# Patient Record
Sex: MALE | Race: WHITE | HISPANIC OR LATINO | Employment: FULL TIME | URBAN - METROPOLITAN AREA
[De-identification: names, ages, dates, MRNs, and addresses within clinical notes are randomized per-mention and may not be internally consistent; named-entity substitution may affect disease eponyms.]

---

## 2021-02-20 ENCOUNTER — HOSPITAL ENCOUNTER (EMERGENCY)
Facility: CLINIC | Age: 36
Discharge: HOME | End: 2021-02-20
Attending: EMERGENCY MEDICINE | Admitting: EMERGENCY MEDICINE
Payer: COMMERCIAL

## 2021-02-20 ENCOUNTER — APPOINTMENT (OUTPATIENT)
Dept: GENERAL RADIOLOGY | Facility: CLINIC | Age: 36
End: 2021-02-20
Attending: EMERGENCY MEDICINE
Payer: COMMERCIAL

## 2021-02-20 VITALS
HEIGHT: 66 IN | SYSTOLIC BLOOD PRESSURE: 153 MMHG | DIASTOLIC BLOOD PRESSURE: 93 MMHG | RESPIRATION RATE: 18 BRPM | WEIGHT: 187 LBS | BODY MASS INDEX: 30.05 KG/M2 | HEART RATE: 74 BPM | TEMPERATURE: 97.4 F | OXYGEN SATURATION: 99 %

## 2021-02-20 DIAGNOSIS — M54.2 NECK PAIN: ICD-10-CM

## 2021-02-20 PROCEDURE — 250N000011 HC RX IP 250 OP 636: Performed by: EMERGENCY MEDICINE

## 2021-02-20 PROCEDURE — 72040 X-RAY EXAM NECK SPINE 2-3 VW: CPT

## 2021-02-20 PROCEDURE — 99283 EMERGENCY DEPT VISIT LOW MDM: CPT

## 2021-02-20 PROCEDURE — 250N000013 HC RX MED GY IP 250 OP 250 PS 637: Performed by: EMERGENCY MEDICINE

## 2021-02-20 RX ORDER — IBUPROFEN 600 MG/1
600 TABLET, FILM COATED ORAL ONCE
Status: COMPLETED | OUTPATIENT
Start: 2021-02-20 | End: 2021-02-20

## 2021-02-20 RX ORDER — HYDROCODONE BITARTRATE AND ACETAMINOPHEN 5; 325 MG/1; MG/1
2 TABLET ORAL ONCE
Status: COMPLETED | OUTPATIENT
Start: 2021-02-20 | End: 2021-02-20

## 2021-02-20 RX ORDER — ONDANSETRON 4 MG/1
4 TABLET, ORALLY DISINTEGRATING ORAL EVERY 8 HOURS PRN
Qty: 10 TABLET | Refills: 0 | Status: SHIPPED | OUTPATIENT
Start: 2021-02-20 | End: 2021-02-23

## 2021-02-20 RX ORDER — HYDROCODONE BITARTRATE AND ACETAMINOPHEN 5; 325 MG/1; MG/1
1 TABLET ORAL EVERY 6 HOURS PRN
Qty: 10 TABLET | Refills: 0 | Status: SHIPPED | OUTPATIENT
Start: 2021-02-20 | End: 2021-02-23

## 2021-02-20 RX ORDER — CYCLOBENZAPRINE HCL 10 MG
10 TABLET ORAL ONCE
Status: COMPLETED | OUTPATIENT
Start: 2021-02-20 | End: 2021-02-20

## 2021-02-20 RX ORDER — ONDANSETRON 4 MG/1
4 TABLET, ORALLY DISINTEGRATING ORAL ONCE
Status: COMPLETED | OUTPATIENT
Start: 2021-02-20 | End: 2021-02-20

## 2021-02-20 RX ORDER — ACETAMINOPHEN 325 MG/1
975 TABLET ORAL ONCE
Status: COMPLETED | OUTPATIENT
Start: 2021-02-20 | End: 2021-02-20

## 2021-02-20 RX ADMIN — CYCLOBENZAPRINE 10 MG: 10 TABLET, FILM COATED ORAL at 05:36

## 2021-02-20 RX ADMIN — HYDROCODONE BITARTRATE AND ACETAMINOPHEN 2 TABLET: 5; 325 TABLET ORAL at 06:33

## 2021-02-20 RX ADMIN — ACETAMINOPHEN 975 MG: 325 TABLET, FILM COATED ORAL at 05:36

## 2021-02-20 RX ADMIN — ONDANSETRON 4 MG: 4 TABLET, ORALLY DISINTEGRATING ORAL at 06:33

## 2021-02-20 RX ADMIN — IBUPROFEN 600 MG: 600 TABLET, FILM COATED ORAL at 05:36

## 2021-02-20 ASSESSMENT — MIFFLIN-ST. JEOR: SCORE: 1730.98

## 2021-02-20 ASSESSMENT — ENCOUNTER SYMPTOMS
NECK PAIN: 1
MYALGIAS: 1
BACK PAIN: 1

## 2021-02-20 NOTE — ED PROVIDER NOTES
"  History   Chief Complaint:  Neck Pain       The history is provided by the patient.      Benjamin Torres is a 35 year old male with history of tobacco abuse who presents for evaluation of neck pain. The patient developed posterior neck pain 24 hours ago after he leaned over to brush his teeth, stating he had to use his hands to pick his head back up. He went to the chiropractor two hours later and pain was improved for about 45 minutes, but he presents to the ED because pain is overwhelming. Pain radiates down both arms to the elbows and down the back just below the shoulder blades. He took 600 mg ibuprofen without improvement.     Review of Systems   Musculoskeletal: Positive for back pain, myalgias and neck pain.   All other systems reviewed and are negative.      Allergies:  No Known Allergies    Medications:  The patient is currently on no regular medications.    Past Medical History:     Sebaceous cyst vs lipoma   Tobacco abuse     Past Surgical History:    Fracture tx, ankle rt/lt    Family History:    Hypertension     Social History:  Presents with his wife  Tobacco use: history of tobacco abuse     Physical Exam     Patient Vitals for the past 24 hrs:   BP Temp Temp src Pulse Resp SpO2 Height Weight   02/20/21 0753 -- -- -- -- 18 -- -- --   02/20/21 0513 (!) 153/93 97.4  F (36.3  C) Oral 74 16 99 % 1.676 m (5' 6\") 84.8 kg (187 lb)       Physical Exam  Vitals: reviewed by me  General: Pt seen on [facility], pleasant, cooperative, and alert to conversation  Eyes: Tracking well, clear conjunctiva BL  ENT: MMM, midline trachea.  Very guarded, but able to look in both directions 45 degrees.  Full range of motion anteriorly with chin to chest maneuver.  Lungs:   No tachypnea, no accessory muscle use. No respiratory distress.   CV: Rate as above, regular rhythm.    MSK: no peripheral edema or joint effusion.  No evidence of trauma  Skin: No rash, normal turgor and temperature  Neuro: Clear speech and no " facial droop.  Bilateral upper extremities with sensation intact light touch to first dorsal webspace, index finger and pinky finger.  Can give me the A-OK, thumbs up, 2 3 cross.  5 out of 5  strength elbow flexion elbow extension shoulder flexion shoulder extension.  Psych: Not RIS, no e/o AH/VH      Emergency Department Course     Imaging:  XR Cervical Spine 2/3 Views:  Mild reversal normal lordotic curvature. Mild prevertebral soft tissue thickening at C4 measuring 7 mm, nonspecific, as per radiology.      Procedures  None    Emergency Department Course:    Reviewed:  I reviewed nursing notes, vitals and past medical history    Assessments:  0613 I obtained history and examined the patient as noted above.   0742 I rechecked the patient and explained findings. He is feeling improved after medication.     Interventions:  0536 Tylenol, 975 mg, PO  0536 Ibuprofen, 600 mg, PO  0536 Flexeril, 10 mg, PO  0633 Hydrocodone-acetaminophen 5-325 mg per tablet, 2 tablet, PO  0633 Zofran, 4 mg, PO    Disposition:  The patient was discharged to home.       Impression & Plan     CMS Diagnoses: None    Medical Decision Making:  Benjamin Torres is a very pleasant 35 year old male who presents to the emergency room with 24 hours of what appears to be possible disc disease in his neck. He has no radicular symptoms, he has no neurologic issues, and his pain and range of motion to his neck has improved with pain control here. I do no think he needs an MRI, I do not think that he has an urgent neurologic impairment based on my exam and history and his improvement here in the ER. We talked about how he should follow with his regular doctor in the next week, and also was given some pain medications to go home with. Very clear return to ED precautions were given for any type of neurologic issue that could possibly develop if his disc disease gets worse. Will plan for discharge home, thankfully the x-ray of his neck is also normal,  and there is no history of trauma.       Covid-19  Benjamin Torres was evaluated during a global COVID-19 pandemic, which necessitated consideration that the patient might be at risk for infection with the SARS-CoV-2 virus that causes COVID-19.   Applicable protocols for evaluation were followed during the patient's care.   COVID-19 was considered as part of the patient's evaluation. The plan for testing is:  the patient was referred for outpatient testing.    Diagnosis:    ICD-10-CM    1. Neck pain  M54.2        Discharge Medications:  Discharge Medication List as of 2/20/2021  7:44 AM      START taking these medications    Details   HYDROcodone-acetaminophen (NORCO) 5-325 MG tablet Take 1 tablet by mouth every 6 hours as needed, Disp-10 tablet, R-0, Local Print      ondansetron (ZOFRAN ODT) 4 MG ODT tab Take 1 tablet (4 mg) by mouth every 8 hours as needed, Disp-10 tablet, R-0, Local Print             Scribe Disclosure:  I, Jordana Fuentes, am serving as a scribe at 6:02 AM on 2/20/2021 to document services personally performed by Maxim Solano MD based on my observations and the provider's statements to me.        Maxim Solano MD  02/20/21 [id number]

## 2021-02-20 NOTE — ED NOTES
Pt states that yesterday morning he bent to brush his teeth and when he lifted his head to look up he experienced pain on the back of his neck radiating left arm.    Now c/o posterior neck pain radiating down right arm.     Pt reports going to a chiropractor and having x-rays done.     Denies having anything prescribed.     Took 600 MG Ibuprofen yesterday.     Now pain getting worse prompting this visit.

## 2021-02-20 NOTE — LETTER
February 20, 2021      To Whom It May Concern:      Benjamin Torres was seen in our Emergency Department today, 02/20/21.  I expect his condition to improve over the next 1-2days.  He may return to work when improved.    Sincerely,        LUIS Jeffries

## 2021-02-20 NOTE — DISCHARGE INSTRUCTIONS
As we discussed, you need to come back to emergency room immediately if you have any nerve problems, and this would look like numbness, tingling in your hands, or with any loss of motor function.  Come back to the emergency room with any other concerns, or if the medication we have given you does not control your neck pain.  Otherwise I do believe you are safe to follow with your regular doctor, I have given you information to follow with a clinic in [place] in the next week.  This is very important, do be sure to follow-up within 1 week.    Please also use Tylenol and Motrin for your pain.

## 2021-03-02 ENCOUNTER — OFFICE VISIT (OUTPATIENT)
Dept: INTERNAL MEDICINE | Facility: CLINIC | Age: 36
End: 2021-03-02
Payer: COMMERCIAL

## 2021-03-02 VITALS
SYSTOLIC BLOOD PRESSURE: 120 MMHG | OXYGEN SATURATION: 99 % | BODY MASS INDEX: 31.49 KG/M2 | WEIGHT: 195.1 LBS | DIASTOLIC BLOOD PRESSURE: 85 MMHG | RESPIRATION RATE: 16 BRPM | TEMPERATURE: 97.3 F | HEART RATE: 77 BPM

## 2021-03-02 DIAGNOSIS — M54.2 CERVICALGIA: Primary | ICD-10-CM

## 2021-03-02 PROCEDURE — 99204 OFFICE O/P NEW MOD 45 MIN: CPT | Performed by: INTERNAL MEDICINE

## 2021-03-02 RX ORDER — PREDNISONE 20 MG/1
TABLET ORAL
Qty: 20 TABLET | Refills: 0 | Status: SHIPPED | OUTPATIENT
Start: 2021-03-02 | End: 2021-03-14

## 2021-03-02 NOTE — PROGRESS NOTES
"    Assessment & Plan     Cervicalgia    *  Based on your history and exam,  No evidence for herniated disc, spinal stenosis, or vertebral fracture or any other concerning cause.    *  Due to the degree of inflammation in the neck soft tissues;     --Take tapering course of steroids:     --Prednisone 60 mg per day for 3 days, then 40 mg per day for 3 days, then 20 mg per day for 2 days, then 10 mg  (1/2 of 20 mg tablet) per day for 2 days, then stop    *  Once you are done with the steroids, OK to use the over the counter anti-inflammatory     --Aleve 2 tablets twice per day (with food) every day for the next one week, then twice per day as needed.     OR   --Motrin/Advil/Ibuprofen 400-600 mg three times pre day for the next one week, then 2-3 times per day as needed    *  Take Tizanidine (generic Zanaflex) 2 or 4 mg three times per day as needed for severe muscle spasms.  Do not take if you do not have muscle spasms.  The main side effect from this medication is drowsiness.  Do not drink alcohol after taking this, do not drive after taking this, do not use heavy machinery or perform dangerous tasks after taking due to the possible drowsiness.       *  Physical therapy through [facility] (many locations throughout the [facility] and [facility]).   They can help you with the neck issue.    *  At a minimum, performing simple stretches of the upper back, shoulder, neck areas.    Google \"upper back stretching exercises\" and review that information, including videos on how to stretch these tissues.     *  Moist heat in the form of a shower, hot towel, or microwavable bean bag few times per day can help relieve some of the spasm.  One example is a \"Bed Buddy\" available at most drug stores such as [employer].  See the web page :  Http://www.[employer]/products/detail/292 for more details.    *  Consider over the counter Lidocaine patches on the affected area.  Place 1-2 Lidocaine patches direclty " "over the painful area for the most bothersome 12-14 hours of the day, then remove.  You should not wear these patches for more than 12-14 hours per day.   Insurances will almost always not cover lidocaine patches, so you need to get them from the pharmacist.     *  Expect your upper back and neck areas to be more easily re-aggravated over the next couple of weeks to months.  the soft tissue and muscles are going to be more easily re-irritated over the next several weeks so be careful to return to physical action slowly.    *  If your symptoms worsen, do not improve, or If you develop worsening or concerning numbness, tingling or weakness in your hands, fingers, or arms, contact us.              - predniSONE (DELTASONE) 20 MG tablet; Take 3 tablets (60 mg) by mouth daily for 3 days, THEN 2 tablets (40 mg) daily for 3 days, THEN 1 tablet (20 mg) daily for 3 days, THEN 0.5 tablets (10 mg) daily for 3 days.  - tiZANidine (ZANAFLEX) 4 MG tablet; Take 0.5-1 tablets (2-4 mg) by mouth 3 times daily as needed for muscle spasms  - [facility] HAND, AND CHIROPRACTIC REFERRAL; Future             Tobacco Cessation:   reports that he has been smoking cigarettes. He has been smoking about 0.30 packs per day. He has never used smokeless tobacco.      BMI:   Estimated body mass index is 31.49 kg/m  as calculated from the following:    Height as of 2/20/21: 1.676 m (5' 6\").    Weight as of this encounter: 88.5 kg (195 lb 1.6 oz).           Return in about 3 weeks (around 3/23/2021) for for recheck, if the symptoms fail to improve.    Lewis Baumann MD  [facility] CASI Alexander is a 35 year old who presents for the following health issues     HPI       ED/UC Followup:    Facility:  [facility]  Date of visit: 02/20/2021  Reason for visit: Neck Pain- was given hydocodone-acetaminophen, ondansetron  Current Status: sitting, standing, or laying bed still in pain.  Awake because of the pain. " The hydrocodone worked for the first day and they are not working. Is not taking the ondansetron not feeling nauseous. Shooting pains that come and go. Pt is seeing a chiropractor helps a bit but, then the pain will come back. Hard to move.        Pt is going to apply for a TAWANDA.   Pt needs a note excusing him from work until he can get the TAWANDA.     2 jobs:  [employer] store and [employer]     **I reviewed the information recorded in the patient's EPIC chart (including but not limited to medical history, surgical history, family history, problem list, medication list, and allergy list) and updated the information as indicated based on the patients reported information.         Review of Systems   Constitutional, HEENT, cardiovascular, pulmonary, gi and gu systems are negative, except as otherwise noted.      Objective    /85 (BP Location: Right arm, Patient Position: Chair, Cuff Size: Adult Large)   Pulse 77   Temp 97.3  F (36.3  C) (Temporal)   Resp 16   Wt 88.5 kg (195 lb 1.6 oz)   SpO2 99%   BMI 31.49 kg/m    Body mass index is 31.49 kg/m .  Physical Exam     GENERAL alert and no distress  EYES:  Normal sclera,conjunctiva, EOMI  HENT: oral and posterior pharynx without lesions or erythema, facies symmetric  NECK: Neck supple. No LAD, without thyroidmegaly.  Neck shows tight cervical para cervical muscles in spasm. Palpation of these muscles does reproduce the pain exactly.   Normal , finger, bicep, and tricep strength in both arms.  Negative spurlings maneuver.   Normal bicep, tricep, brachioradialis reflexs on both sides.  Neck has FROM in all directions.  No pain with direct palpation of the cervical bodies themselves, the pain is in the surrounding soft/connective tissues.  RESP: Clear to ausculation bilaterally without wheezes or crackles. Normal BS in all fields.  CV: RRR normal S1S2 without murmurs, rubs or gallops.  LYMPH: no cervical lymph adenopathy appreciated  MS: extremities- no gross  deformities of the visible extremities noted,   EXT:  no lower extremity edema  PSYCH: Alert and oriented times 3; speech- coherent  SKIN:  No obvious significant skin lesions on visible portions of face

## 2021-03-02 NOTE — PATIENT INSTRUCTIONS
"  CERVICAL MUSCLE/NECK PAIN:     *  Based on your history and exam,  No evidence for herniated disc, spinal stenosis, or vertebral fracture or any other concerning cause.    *  Due to the degree of inflammation in the neck soft tissues;     --Take tapering course of steroids:     --Prednisone 60 mg per day for 3 days, then 40 mg per day for 3 days, then 20 mg per day for 2 days, then 10 mg  (1/2 of 20 mg tablet) per day for 2 days, then stop    *  Once you are done with the steroids, OK to use the over the counter anti-inflammatory     --Aleve 2 tablets twice per day (with food) every day for the next one week, then twice per day as needed.     OR   --Motrin/Advil/Ibuprofen 400-600 mg three times pre day for the next one week, then 2-3 times per day as needed    *  Take Tizanidine (generic Zanaflex) 2 or 4 mg three times per day as needed for severe muscle spasms.  Do not take if you do not have muscle spasms.  The main side effect from this medication is drowsiness.  Do not drink alcohol after taking this, do not drive after taking this, do not use heavy machinery or perform dangerous tasks after taking due to the possible drowsiness.       *  Physical therapy through [facility] (many locations throughout the [facility] and [facility]).   They can help you with the neck issue.    *  At a minimum, performing simple stretches of the upper back, shoulder, neck areas.    Google \"upper back stretching exercises\" and review that information, including videos on how to stretch these tissues.     *  Moist heat in the form of a shower, hot towel, or microwavable bean bag few times per day can help relieve some of the spasm.  One example is a \"Bed Buddy\" available at most drug stores such as [employer].  See the web page :  Http://www.[employer]/products/detail/292 for more details.    *  Consider over the counter Lidocaine patches on the affected area.  Place 1-2 Lidocaine patches direclty over the " painful area for the most bothersome 12-14 hours of the day, then remove.  You should not wear these patches for more than 12-14 hours per day.   Insurances will almost always not cover lidocaine patches, so you need to get them from the pharmacist.     Examples of over the counter Lidocaine patches:          *  Expect your upper back and neck areas to be more easily re-aggravated over the next couple of weeks to months.  the soft tissue and muscles are going to be more easily re-irritated over the next several weeks so be careful to return to physical action slowly.    *  If your symptoms worsen, do not improve, or If you develop worsening or concerning numbness, tingling or weakness in your hands, fingers, or arms, contact us.

## 2021-04-17 ENCOUNTER — HEALTH MAINTENANCE LETTER (OUTPATIENT)
Age: 36
End: 2021-04-17

## 2021-10-02 ENCOUNTER — HEALTH MAINTENANCE LETTER (OUTPATIENT)
Age: 36
End: 2021-10-02

## 2022-05-08 ENCOUNTER — HEALTH MAINTENANCE LETTER (OUTPATIENT)
Age: 37
End: 2022-05-08

## 2022-08-29 ENCOUNTER — APPOINTMENT (OUTPATIENT)
Dept: CT IMAGING | Facility: CLINIC | Age: 37
End: 2022-08-29
Attending: EMERGENCY MEDICINE
Payer: COMMERCIAL

## 2022-08-29 ENCOUNTER — HOSPITAL ENCOUNTER (EMERGENCY)
Facility: CLINIC | Age: 37
Discharge: HOME | End: 2022-08-29
Attending: EMERGENCY MEDICINE | Admitting: EMERGENCY MEDICINE
Payer: COMMERCIAL

## 2022-08-29 VITALS
OXYGEN SATURATION: 100 % | HEART RATE: 74 BPM | RESPIRATION RATE: 20 BRPM | TEMPERATURE: 98.7 F | DIASTOLIC BLOOD PRESSURE: 80 MMHG | SYSTOLIC BLOOD PRESSURE: 107 MMHG

## 2022-08-29 DIAGNOSIS — N20.1 URETEROLITHIASIS: ICD-10-CM

## 2022-08-29 DIAGNOSIS — N23 RENAL COLIC ON LEFT SIDE: ICD-10-CM

## 2022-08-29 DIAGNOSIS — R79.89 ELEVATED SERUM CREATININE: ICD-10-CM

## 2022-08-29 LAB
ALBUMIN UR-MCNC: NEGATIVE MG/DL
ANION GAP SERPL CALCULATED.3IONS-SCNC: 11 MMOL/L (ref 7–15)
APPEARANCE UR: CLEAR
BASOPHILS # BLD AUTO: 0 10E3/UL (ref 0–0.2)
BASOPHILS NFR BLD AUTO: 0 %
BILIRUB UR QL STRIP: NEGATIVE
BUN SERPL-MCNC: 13.6 MG/DL (ref 6–20)
CALCIUM SERPL-MCNC: 9.2 MG/DL (ref 8.6–10)
CHLORIDE SERPL-SCNC: 100 MMOL/L (ref 98–107)
COLOR UR AUTO: ABNORMAL
CREAT SERPL-MCNC: 1.21 MG/DL (ref 0.67–1.17)
DEPRECATED HCO3 PLAS-SCNC: 25 MMOL/L (ref 22–29)
EOSINOPHIL # BLD AUTO: 0.1 10E3/UL (ref 0–0.7)
EOSINOPHIL NFR BLD AUTO: 1 %
ERYTHROCYTE [DISTWIDTH] IN BLOOD BY AUTOMATED COUNT: 12.6 % (ref 10–15)
GFR SERPL CREATININE-BSD FRML MDRD: 80 ML/MIN/1.73M2
GLUCOSE SERPL-MCNC: 107 MG/DL (ref 70–99)
GLUCOSE UR STRIP-MCNC: NEGATIVE MG/DL
HCT VFR BLD AUTO: 42.8 % (ref 40–53)
HGB BLD-MCNC: 13.9 G/DL (ref 13.3–17.7)
HGB UR QL STRIP: ABNORMAL
HOLD SPECIMEN: NORMAL
IMM GRANULOCYTES # BLD: 0 10E3/UL
IMM GRANULOCYTES NFR BLD: 0 %
KETONES UR STRIP-MCNC: NEGATIVE MG/DL
LEUKOCYTE ESTERASE UR QL STRIP: NEGATIVE
LYMPHOCYTES # BLD AUTO: 2.1 10E3/UL (ref 0.8–5.3)
LYMPHOCYTES NFR BLD AUTO: 18 %
MCH RBC QN AUTO: 28.4 PG (ref 26.5–33)
MCHC RBC AUTO-ENTMCNC: 32.5 G/DL (ref 31.5–36.5)
MCV RBC AUTO: 87 FL (ref 78–100)
MONOCYTES # BLD AUTO: 0.5 10E3/UL (ref 0–1.3)
MONOCYTES NFR BLD AUTO: 5 %
NEUTROPHILS # BLD AUTO: 8.5 10E3/UL (ref 1.6–8.3)
NEUTROPHILS NFR BLD AUTO: 76 %
NITRATE UR QL: NEGATIVE
NRBC # BLD AUTO: 0 10E3/UL
NRBC BLD AUTO-RTO: 0 /100
PH UR STRIP: 5 [PH] (ref 5–7)
PLATELET # BLD AUTO: 265 10E3/UL (ref 150–450)
POTASSIUM SERPL-SCNC: 4.3 MMOL/L (ref 3.4–5.3)
RBC # BLD AUTO: 4.9 10E6/UL (ref 4.4–5.9)
RBC URINE: 39 /HPF
SODIUM SERPL-SCNC: 136 MMOL/L (ref 136–145)
SP GR UR STRIP: 1.02 (ref 1–1.03)
SQUAMOUS EPITHELIAL: <1 /HPF
UROBILINOGEN UR STRIP-MCNC: NORMAL MG/DL
WBC # BLD AUTO: 11.3 10E3/UL (ref 4–11)
WBC URINE: <1 /HPF

## 2022-08-29 PROCEDURE — 96374 THER/PROPH/DIAG INJ IV PUSH: CPT

## 2022-08-29 PROCEDURE — 85025 COMPLETE CBC W/AUTO DIFF WBC: CPT | Performed by: EMERGENCY MEDICINE

## 2022-08-29 PROCEDURE — 250N000013 HC RX MED GY IP 250 OP 250 PS 637: Performed by: EMERGENCY MEDICINE

## 2022-08-29 PROCEDURE — 36415 COLL VENOUS BLD VENIPUNCTURE: CPT | Performed by: EMERGENCY MEDICINE

## 2022-08-29 PROCEDURE — 99284 EMERGENCY DEPT VISIT MOD MDM: CPT | Mod: 25

## 2022-08-29 PROCEDURE — 74176 CT ABD & PELVIS W/O CONTRAST: CPT

## 2022-08-29 PROCEDURE — 250N000011 HC RX IP 250 OP 636: Performed by: EMERGENCY MEDICINE

## 2022-08-29 PROCEDURE — 80048 BASIC METABOLIC PNL TOTAL CA: CPT | Performed by: EMERGENCY MEDICINE

## 2022-08-29 PROCEDURE — 81001 URINALYSIS AUTO W/SCOPE: CPT | Performed by: EMERGENCY MEDICINE

## 2022-08-29 RX ORDER — OXYCODONE HYDROCHLORIDE 5 MG/1
5 TABLET ORAL EVERY 6 HOURS PRN
Qty: 12 TABLET | Refills: 0 | Status: SHIPPED | OUTPATIENT
Start: 2022-08-29

## 2022-08-29 RX ORDER — ONDANSETRON 4 MG/1
4 TABLET, ORALLY DISINTEGRATING ORAL EVERY 6 HOURS PRN
Qty: 10 TABLET | Refills: 0 | Status: SHIPPED | OUTPATIENT
Start: 2022-08-29 | End: 2022-09-01

## 2022-08-29 RX ORDER — TAMSULOSIN HYDROCHLORIDE 0.4 MG/1
0.4 CAPSULE ORAL DAILY
Qty: 7 CAPSULE | Refills: 0 | Status: SHIPPED | OUTPATIENT
Start: 2022-08-29 | End: 2022-09-05

## 2022-08-29 RX ORDER — TAMSULOSIN HYDROCHLORIDE 0.4 MG/1
0.4 CAPSULE ORAL ONCE
Status: COMPLETED | OUTPATIENT
Start: 2022-08-29 | End: 2022-08-29

## 2022-08-29 RX ORDER — KETOROLAC TROMETHAMINE 15 MG/ML
15 INJECTION, SOLUTION INTRAMUSCULAR; INTRAVENOUS ONCE
Status: COMPLETED | OUTPATIENT
Start: 2022-08-29 | End: 2022-08-29

## 2022-08-29 RX ADMIN — TAMSULOSIN HYDROCHLORIDE 0.4 MG: 0.4 CAPSULE ORAL at 03:32

## 2022-08-29 RX ADMIN — KETOROLAC TROMETHAMINE 15 MG: 15 INJECTION, SOLUTION INTRAMUSCULAR; INTRAVENOUS at 01:49

## 2022-08-29 ASSESSMENT — ACTIVITIES OF DAILY LIVING (ADL)
ADLS_ACUITY_SCORE: 35
ADLS_ACUITY_SCORE: 35

## 2022-08-29 NOTE — ED PROVIDER NOTES
Visit Date: 08/29/2022    CHIEF COMPLAINT:  Left flank pain.    HISTORY OF PRESENT ILLNESS:  This is a 37-year-old gentleman who has been dealing with left flank pain for the past week.  It has been on and off located on his left thigh.  He has had no falls or trauma.  He has noticed some dark urine and urgency to urinate with perhaps some burning at the tip of his penis.  No discharge.  No fevers.  He has been nauseous without vomiting.  No other complaints at this time.  His pain worsened this evening.    PAST MEDICAL HISTORY:  None.    PAST SURGICAL HISTORY:  Left ankle reconstruction surgery.    MEDICATIONS:  None.    ALLERGIES:  NONE.    SOCIAL HISTORY:  The patient presents by himself.  He smokes.    REVIEW OF SYSTEMS:  A pertinent 10-point review of systems is negative except for that noted in the HPI.    PHYSICAL EXAMINATION:    GENERAL:  The patient is uncomfortable appearing.  VITAL SIGNS:  Blood pressure 133/93, heart rate 99, respiratory rate 20, oxygen 97% on room air, temperature 98.7 degrees.  HEENT:  Moist mucous membranes.  CARDIOVASCULAR:  Regular rhythm, normal rate.  No murmurs.  RESPIRATORY:  Clear to auscultation bilaterally, without wheezes or rales.  GASTROINTESTINAL:  Soft, nondistended, nontender.  MUSCULOSKELETAL:  Freely moving all extremities.  SKIN:  No pertinent skin findings.  NEUROLOGIC:  The patient is alert and oriented x 4.  PSYCHIATRIC:  The patient has normal mood and affect.    LABORATORY EVALUATION AND DIAGNOSTIC STUDIES:    CBC shows a creatinine of 1.21, otherwise normal.  CBC shows a white blood cell count of 11.3, hemoglobin 13.9 and platelets 265.      Urinalysis shows 39 red blood cells per high powered field, otherwise, normal.    IMAGING:  Noncontrasted CT scan shows a 4 mm obstructing stone in the distal left ureter just above the UVJ, resulting in mild left hydronephrosis.  Single tiny 2 mm nonobstructing stone is seen in each kidney.  No significant findings  elsewhere.    EMERGENCY DEPARTMENT COURSE AND MEDICAL DECISION MAKING:  This is a 37-year-old gentleman who presents with left flank pain.  CT confirms an obstructing distal left ureteral stone measuring 4 mm.  He will be an excellent patient for outpatient management.  He has received Toradol in triage.  No evidence of urinary source infection.  Plan of care will be supportive with outpatient oxycodone, Zofran, and Flomax and primary care followup with appropriate return precautions discussed.    DIAGNOSES:     1.  Acute left renal colic secondary to ureterolithiasis.  2.  Microscopic hematuria secondary to above.  3.  Mild creatinine elevation.    PLAN OF CARE:  As above.    Yovani Phelan MD        D: 2022   T: 2022   MT: MILTNO    Name:     VELASCO CHELSEA HRAT  MRN:      [id number]        Account:    [id number]   :      1985           Visit Date: 2022     Document: [id number]

## 2022-08-29 NOTE — LETTER
August 29, 2022      To Whom It May Concern:      Benjamin Torres was seen in our Emergency Department today, 08/29/22.  I expect his condition to improve over the next 2-3 days.  He may return to work when improved.    Sincerely,        LUIS Carey

## 2022-08-29 NOTE — ED TRIAGE NOTES
Pt states intermittent left flank pain and hematuria for one week, worse tonight. ABCs intact GCS 15     Triage Assessment     Row Name 08/29/22 0138       Triage Assessment (Adult)    Airway WDL WDL       Respiratory WDL    Respiratory WDL WDL       Skin Circulation/Temperature WDL    Skin Circulation/Temperature WDL WDL       Cardiac WDL    Cardiac WDL WDL       Peripheral/Neurovascular WDL    Peripheral Neurovascular WDL WDL       Cognitive/Neuro/Behavioral WDL    Cognitive/Neuro/Behavioral WDL WDL

## 2022-08-30 DIAGNOSIS — R69 DIAGNOSIS UNKNOWN: Primary | ICD-10-CM

## 2022-08-31 ENCOUNTER — VIRTUAL VISIT (OUTPATIENT)
Dept: UROLOGY | Facility: CLINIC | Age: 37
End: 2022-08-31
Payer: COMMERCIAL

## 2022-08-31 DIAGNOSIS — R69 DIAGNOSIS UNKNOWN: ICD-10-CM

## 2022-08-31 DIAGNOSIS — R10.9 ACUTE LEFT FLANK PAIN: ICD-10-CM

## 2022-08-31 DIAGNOSIS — N20.1 CALCULUS OF URETER: Primary | ICD-10-CM

## 2022-08-31 DIAGNOSIS — N13.2 HYDRONEPHROSIS WITH URINARY OBSTRUCTION DUE TO URETERAL CALCULUS: ICD-10-CM

## 2022-08-31 DIAGNOSIS — N20.0 CALCULUS OF KIDNEY: ICD-10-CM

## 2022-08-31 PROCEDURE — 99203 OFFICE O/P NEW LOW 30 MIN: CPT | Mod: 95 | Performed by: PHYSICIAN ASSISTANT

## 2022-08-31 RX ORDER — IBUPROFEN 200 MG
200 TABLET ORAL EVERY 4 HOURS PRN
COMMUNITY

## 2022-08-31 RX ORDER — DIMENHYDRINATE 50 MG
50 TABLET ORAL
COMMUNITY

## 2022-08-31 RX ORDER — ACETAMINOPHEN 500 MG
500-1000 TABLET ORAL EVERY 6 HOURS PRN
COMMUNITY

## 2022-08-31 ASSESSMENT — PAIN SCALES - GENERAL: PAINLEVEL: SEVERE PAIN (6)
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
## 2022-08-31 NOTE — PATIENT INSTRUCTIONS
Patient Stated Goal: Pass my stone  Symptom Control While Passing A Stone    The goal of [facility] [place] is to let a smaller kidney stone (less than 4 to 5 mm) pass without intervention if possible. Giving your body a chance to clear the stone may take a few hours up to a few weeks.  Keeping you well-informed, safe and fairly comfortable is important.    Drink to thirst  Do not attempt to  flush out  your stone by drinking too much fluid. This does not work and may increase nausea. Drink enough to satisfy your body s thirst. Eating your normal diet is fine.   Medications (that may be suggested or prescribed)    Ibuprofen (Advil or Motrin) Available over the counter  o Take two (200mg) tablets every six hours until the stone passes.  o Prevents spasm of the ureter.    o Decreases pain.      Dramamine* (drowsy version, non-generic formulation) Available over the counter  o Take 50mg at bedtime  o Decreases spasms of the ureter  o Decreases nausea  o Decreases acute pain  o Decreases recurrence of pain for 24 hours  o Will help you sleep  *This medication will cause increase drowsiness, do not drive or operate machinery for 6 hours.      Narcotics (Percocet, Vicodin, Dilaudid) Take as prescribed for severe pain unrelieved by ibuprofen and Dramamine  o Narcotics have significant side effects and only  cover-up  pain. They have no effect on the cause of pain.  o Common side effects  - Confusion, disorientation and sedation - DO NOT DRIVE OR OPERATE MACHINERY WITHIN 24 HOURS  - Nausea - take Dramamine or Zofran or Haldol to help control  - Constipation  - Sleep disturbances      Ondansetron (Zofran) Take as prescribed  o Reserve for severe nausea  o May cause constipation, start over the counter Miralax if needed      Second Line Anti-Nausea Medication: Adding a different anti-nausea medication maybe helpful for persistent nausea.  The combined effect of different types of anti-nausea medications maybe more  effective than either medication by itself, even in higher doses.  o Compazine: Take as prescribed      Information about kidney stones    Crystals can form if chemicals are too concentrated in your urine. If the crystal grows over time, a stone may form. A stone usually isn t painful while it is still in the kidney.    As the stone begins to leave the kidney, you may experience episodes of flank pain as the kidney stone approaches the entrance to the ureter. Some people feel a vague ache in the side.    Kidney stones may fall into the ureter. Some stones are tiny and pass through without causing symptoms. The ureter is a small tube (approximately 1/8 of an inch wide). A kidney stone can get stuck and block the ureter. If this happens, urine backs up and flows back to the kidney. Back pressure on the kidney can cause:  o Severe flank pain radiating to the groin.  o Severe nausea and vomiting.  o The pain can occur in the lower back, side, groin or all three.      When the stone reaches the lower ureter, this can irritate the bladder and sensations of feeling the urge to urinate frequently and urgently may occur.      Once the stone passes out of your ureter and into your bladder, the symptoms of urgency and frequency will often disappear. Sometimes pain will come back for a short period and will not be as severe as before. The passage of the stone from your bladder and out of your body is usually not a problem. The urethra is at least twice as wide as the normal ureter, so the stone doesn t usually block it.    Strain all urine  If you pass the stone, save it. Place it in the container we have provided and bring it to the [facility] [place] within a week of passing it. Your stone will then be sent for analysis which takes about a month.     Signs and symptoms you might experience    Nausea    Decreased appetite    Urinary frequency    Bloody urine     Chills    Fatigue    When to call [facility] [place] or  go to the Emergency Room    Fever with a temperature greater than 100.1    Severe pain    Persistent nausea/vomiting    If the pain worsens or nausea/vomiting is uncontrolled with medications, STOP eating & drinking. You need to have an empty stomach for 8 hours prior to surgery. Call the [facility] [place] immediately at [phone].           Follow-up    Low dose CT scan with doctor visit 1-2 weeks after initial clinic visit per doctor s instructions    Please cancel the CT scan visit if you pass a stone. Reschedule for a one month follow-up with doctor to discuss stone composition and future prevention.    Preventing future stones    Approximately a month after your stone is sent out for analysis, a prevention visit will occur with your provider, to discuss an individualized plan for prevention of new stones and to discuss managing stones that you may still have. Along with the analysis of the kidney stone, blood and urine tests may be indicated to develop this plan. Knowing the type of kidney stones you make, and why, allows the providers at the [facility] to recommend specific ways to prevent them.    Follow-up visits are an important part of monitoring and preventing future re-occurrences.    The [facility] is available for questions or concerns 24 hours a day at [phone]

## 2022-08-31 NOTE — PROGRESS NOTES
Patient is roomed via telephone for a virtual visit.  Patient confirmed he is in the [facility] at the time of this appointment.  Patient understands that this virtual visit is billable and agree to proceed with appointment.

## 2022-09-03 ENCOUNTER — NURSE TRIAGE (OUTPATIENT)
Dept: NURSING | Facility: CLINIC | Age: 37
End: 2022-09-03

## 2022-09-04 NOTE — TELEPHONE ENCOUNTER
Nurse Triage SBAR    Situation:  ED on Monday for left flank pain. Diagnosed with a kidney stone.  Medication given for pain, which helped. Had virtual visit on 08/31/2022 with urology.     Background: Patient, Benjamin, calling. Consent: not needed.    Assessment:  Now with increase pain. Taking Ibuprofen, Dramamine, Tylenol, for pain. Only helps for 3 hours. Pain is worse now and constant. He rates it a 9/10 on the pain scale.    Was on oxycodone, and that helped, but he is out of that now, and doesn't like the way it makes him feel.  Nausea off and on, but no vomiting. Denies fever. He is able to urinate as normal, drinking fluids.     Protocol Recommended Disposition: Emergency Department (Or PCP Triage)    Recommendation: According to the protocol, Patient should go to the ED now (Or PCP Triage). Advised Patient to go to the ED now. As he does not have a PCP. Care advice given. Patient verbalizes understanding and agrees with plan of care. Reviewed concerning symptoms and when to call back. He stated that he would go to the ED now, as instructed by the ED, his urologist, and myself.      Alexa Gaona RN  [facility] Nurse Advisor  9/3/2022 at 10:20 PM        Reason for Disposition    [1] SEVERE pain (e.g., excruciating, scale 8-10) AND [2] not improved after pain medicine    Additional Information    Negative: Shock suspected (e.g., cold/pale/clammy skin, too weak to stand, low BP, rapid pulse)    Negative: Sounds like a life-threatening emergency to the triager    Negative: [1] Back pain AND [2] NOT recently diagnosed with a kidney stone    Negative: [1] Flank pain (i.e., pain in the side, over the lower ribs or just below the ribs) AND [2] NOT recently diagnosed with a kidney stone    Negative: [1] Unable to urinate (or only a few drops) > 4 hours AND [2] bladder feels very full (e.g., palpable bladder or strong urge to urinate)    Protocols used: [facility]

## 2022-10-12 DIAGNOSIS — N20.1 CALCULUS OF URETER: Primary | ICD-10-CM

## 2023-01-14 ENCOUNTER — HEALTH MAINTENANCE LETTER (OUTPATIENT)
Age: 38
End: 2023-01-14

## 2023-06-02 ENCOUNTER — HEALTH MAINTENANCE LETTER (OUTPATIENT)
Age: 38
End: 2023-06-02

## 2024-06-30 ENCOUNTER — HEALTH MAINTENANCE LETTER (OUTPATIENT)
Age: 39
End: 2024-06-30

## 2025-07-13 ENCOUNTER — HEALTH MAINTENANCE LETTER (OUTPATIENT)
Age: 40
End: 2025-07-13